# Patient Record
Sex: MALE | Race: WHITE | NOT HISPANIC OR LATINO
[De-identification: names, ages, dates, MRNs, and addresses within clinical notes are randomized per-mention and may not be internally consistent; named-entity substitution may affect disease eponyms.]

---

## 2022-12-28 ENCOUNTER — APPOINTMENT (OUTPATIENT)
Dept: CARDIOLOGY | Facility: CLINIC | Age: 72
End: 2022-12-28

## 2022-12-28 ENCOUNTER — NON-APPOINTMENT (OUTPATIENT)
Age: 72
End: 2022-12-28

## 2022-12-28 VITALS
SYSTOLIC BLOOD PRESSURE: 137 MMHG | BODY MASS INDEX: 23.4 KG/M2 | HEIGHT: 69 IN | OXYGEN SATURATION: 99 % | HEART RATE: 72 BPM | DIASTOLIC BLOOD PRESSURE: 74 MMHG | WEIGHT: 158 LBS

## 2022-12-28 DIAGNOSIS — Z78.9 OTHER SPECIFIED HEALTH STATUS: ICD-10-CM

## 2022-12-28 DIAGNOSIS — Z86.79 PERSONAL HISTORY OF OTHER DISEASES OF THE CIRCULATORY SYSTEM: ICD-10-CM

## 2022-12-28 DIAGNOSIS — I25.10 ATHEROSCLEROTIC HEART DISEASE OF NATIVE CORONARY ARTERY W/OUT ANGINA PECTORIS: ICD-10-CM

## 2022-12-28 DIAGNOSIS — Z87.828 PERSONAL HISTORY OF OTHER (HEALED) PHYSICAL INJURY AND TRAUMA: ICD-10-CM

## 2022-12-28 DIAGNOSIS — E78.5 HYPERLIPIDEMIA, UNSPECIFIED: ICD-10-CM

## 2022-12-28 DIAGNOSIS — Z01.810 ENCOUNTER FOR PREPROCEDURAL CARDIOVASCULAR EXAMINATION: ICD-10-CM

## 2022-12-28 DIAGNOSIS — Z82.5 FAMILY HISTORY OF ASTHMA AND OTHER CHRONIC LOWER RESPIRATORY DISEASES: ICD-10-CM

## 2022-12-28 DIAGNOSIS — Z82.49 FAMILY HISTORY OF ISCHEMIC HEART DISEASE AND OTHER DISEASES OF THE CIRCULATORY SYSTEM: ICD-10-CM

## 2022-12-28 DIAGNOSIS — Z86.39 PERSONAL HISTORY OF OTHER ENDOCRINE, NUTRITIONAL AND METABOLIC DISEASE: ICD-10-CM

## 2022-12-28 PROBLEM — Z00.00 ENCOUNTER FOR PREVENTIVE HEALTH EXAMINATION: Status: ACTIVE | Noted: 2022-12-28

## 2022-12-28 PROCEDURE — 99204 OFFICE O/P NEW MOD 45 MIN: CPT | Mod: 25

## 2022-12-28 PROCEDURE — 93000 ELECTROCARDIOGRAM COMPLETE: CPT | Mod: NC

## 2022-12-29 PROBLEM — E78.5 HYPERLIPIDEMIA: Status: ACTIVE | Noted: 2022-12-29

## 2022-12-29 PROBLEM — Z86.79 HISTORY OF CORONARY ATHEROSCLEROSIS: Status: RESOLVED | Noted: 2022-12-29 | Resolved: 2022-12-29

## 2022-12-29 PROBLEM — Z86.39 HISTORY OF HYPERLIPIDEMIA: Status: RESOLVED | Noted: 2022-12-29 | Resolved: 2022-12-29

## 2022-12-29 PROBLEM — Z78.9 SOCIAL ALCOHOL USE: Status: ACTIVE | Noted: 2022-12-29

## 2022-12-29 PROBLEM — Z87.828 HISTORY OF TRAUMA: Status: RESOLVED | Noted: 2022-12-29 | Resolved: 2022-12-29

## 2022-12-29 PROBLEM — I25.10 ATHEROSCLEROTIC HEART DISEASE: Status: ACTIVE | Noted: 2022-12-29

## 2022-12-29 PROBLEM — Z01.810 PREOPERATIVE CARDIOVASCULAR EXAMINATION: Status: ACTIVE | Noted: 2022-12-29

## 2022-12-29 RX ORDER — ATORVASTATIN CALCIUM 80 MG/1
TABLET, FILM COATED ORAL
Refills: 0 | Status: ACTIVE | COMMUNITY

## 2022-12-29 RX ORDER — UBIDECARENONE/VIT E ACET 100MG-5
CAPSULE ORAL
Refills: 0 | Status: ACTIVE | COMMUNITY

## 2022-12-29 RX ORDER — PNV NO.95/FERROUS FUM/FOLIC AC 28MG-0.8MG
TABLET ORAL
Refills: 0 | Status: ACTIVE | COMMUNITY

## 2022-12-29 RX ORDER — RAMIPRIL 5 MG/1
CAPSULE ORAL
Refills: 0 | Status: ACTIVE | COMMUNITY

## 2022-12-29 RX ORDER — ASPIRIN 81 MG
81 TABLET, DELAYED RELEASE (ENTERIC COATED) ORAL
Refills: 0 | Status: ACTIVE | COMMUNITY

## 2022-12-29 NOTE — DISCUSSION/SUMMARY
[FreeTextEntry1] : Preoperative cardiovascular examination\par \par A  symptomatic  left inguinal hernia led to a recommendation for surgical repair by Dr. Penaloza. The operation is planned for 1/11/23\par \par Comment\par There is no cardiac contraindication to general surgery. There has been no recent myocardial infarction. There are no cardiac related symptoms. The patient is hemodynamically stable without evidence of heart failure. The preoperative 12/28/22 electrocardiogram is normal  showing  no evidence of myocardial ischemia or infarction. As such, the operation may proceed with an acceptable cardiac risk.\par \par I have recommended the following\par a. Await preoperative laboratory studies and chest x-ray not presently available\par b. Further cardiac testing is not required\par c. Workup and treatment as directed by Dr. Tod Penaloza\par d. Call if any cardiovascular issues arise\par \par \par Atherosclerotic heart disease\par \par Mr. Byrd has known atherosclerotic heart disease. In 10/17 he had a an inferior wall STEMI.\par He underwent PCI DESx2 of distal RCA stenosis. The angiographic and cardiac records are not available for review. Since that time there has been no recurrent cardiac event.\par The resting 12/22 electrocardiogram is normal showing no evidence of myocardial ischemia or infarction. In view of the lack of symptoms and clinical course continued medical management is indicated at this time Measures to control modifiable risk factors for the development of atherosclerotic disease will be important in long-term management.\par \par I have recommended the following\par a. Risk factor modification\par b. Prior cardiac records including coronary angiogram report, echocardiographic study stress test etc. not available at this time are requested for review\par c. Continue the present medical regimen\par \par \par \par Hyperlipidemia\par Hyperlipidemia represents a risk factor for progressive atherosclerotic disease. The target LDL level with known atherosclerotic heart disease is about 70. HMG coag reductase inhibitor therapy has been effective. In 4/22 the serum cholesterol level was 140 triglycerides 85 and LDL 84.Zetia may  be  added  to the present medical regimen if required to maintain optimal levels. Nonpharmacological therapy, specifically diet and exercise are emphasized  as  major aspects of treatment.\par \par I have  recommended the following\par a. Low fat low cholesterol heart healthy diet. Regular aerobic exercise\par b. Continue  the  present medical regimen\par c. Target LDL level to about 70 as discussed above\par d. zetia   to be added  to the present medical regimen if required to maintain optimal levels as noted above. \par \par \par \par \par The diagnosis, prognosis, risks, options were explained  at length to the patient. All questions were answered. Issues discussed included cardiology clearance prior to surgery atherosclerotic heart disease hyperlipidemia noninvasive cardiac testing diet and exercise.

## 2022-12-29 NOTE — REVIEW OF SYSTEMS
[Feeling Fatigued] : feeling fatigued [Joint Pain] : joint pain [Negative] : Heme/Lymph [FreeTextEntry3] : glasses [FreeTextEntry5] : see  history of present illness

## 2022-12-29 NOTE — PHYSICAL EXAM
[Normal Conjunctiva] : normal conjunctiva [Normal S1, S2] : normal S1, S2 [Clear Lung Fields] : clear lung fields [Soft] : abdomen soft [Non Tender] : non-tender [Normal Bowel Sounds] : normal bowel sounds [Normal Gait] : normal gait [No Rash] : no rash [No Focal Deficits] : no focal deficits [de-identified] : Blood pressure 137/74 mmHg respirations 16/minute pulse 72/minute oxygen saturation 99% weight 158 pounds. Appears  trim  fit in no distress lying flat [de-identified] : normocephalic [de-identified] : No carotid bruit. No jugular venous distention [de-identified] : No murmur. No gallop heard no diastolic sounds. [de-identified] : full range of motion [de-identified] : no peripheral edema. Dorsalis pedis pulses +2 bilaterally Feet warm and well-perfused. No ulcerations [de-identified] : pleasant

## 2022-12-29 NOTE — HISTORY OF PRESENT ILLNESS
[FreeTextEntry1] : 72-year-old man\par Preoperative cardiovascular examination prior to left inguinal hernia repair\par \par Mr. Byrd has known atherosclerotic heart disease. In 10/17 he had a an inferior wall STEMI.\par He underwent PCI DESx2 of distal RCA stenosis. The angiographic and cardiac records are not available for review. Since that time there has been no recurrent cardiac event.\par \par A  symptomatic  left inguinal hernia led to a recommendation for surgical repair by Dr. Penaloza. The operation is planned for 23\par \par \marissa Rivas denies symptoms of chest pain palpitations shortness of breath or syncope. He is physically active exercising skiing without any restriction or limitation.\par \par There is no history of smoking or illicit drug use. There is a prior history of hyperlipidemia. There is no history of diabetes or hypertension. His father  of a myocardial infarction\par \marissa Rivas presents today for cardiology clearance prior to surgery

## 2023-07-31 ENCOUNTER — APPOINTMENT (OUTPATIENT)
Dept: CARDIOLOGY | Facility: CLINIC | Age: 73
End: 2023-07-31